# Patient Record
Sex: MALE | Race: WHITE
[De-identification: names, ages, dates, MRNs, and addresses within clinical notes are randomized per-mention and may not be internally consistent; named-entity substitution may affect disease eponyms.]

---

## 2018-05-17 ENCOUNTER — HOSPITAL ENCOUNTER (EMERGENCY)
Dept: HOSPITAL 25 - ED | Age: 23
LOS: 1 days | Discharge: HOME | End: 2018-05-18
Payer: COMMERCIAL
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
## 2018-05-17 DIAGNOSIS — S42.002A: Primary | ICD-10-CM
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
## 2018-05-17 DIAGNOSIS — M25.512: ICD-10-CM

## 2018-05-17 PROCEDURE — 99282 EMERGENCY DEPT VISIT SF MDM: CPT

## 2018-05-18 NOTE — RAD
INDICATION:  Left shoulder injury.



TECHNIQUE: 5 views of the left shoulder were obtained.



FINDINGS:  There is a comminuted displaced fracture of the distal clavicle. The fracture

fragments are distracted. No additional fracture is seen.  



IMPRESSION:  COMMINUTED DISPLACED FRACTURE OF THE CLAVICLE.

## 2018-05-18 NOTE — ED
Dane MARTINEZ Angela, scribed for Jackson Simpson MD on 05/18/18 at 0019 .





Upper Extremity Pain





- HPI Summary


HPI Summary: 





This pt is a 21 y/o male presenting to [facility] c/o left shoulder pain today s/p 

fall. Pt reports he was in a hammock when he fell off and injured his left 

shoulder at approximately 22:00. Denies head strike or LOC. Pt denies any other 

injuries sustained from fall. He is right hand dominant.


Denies any PMHx. 





- History of Current Complaint


Chief Complaint: EDShoulderClavicleInj


Stated Complaint: LT SHOULDER INJURY


Hx Obtained From: Patient


Mechanism Of Injury: Direct Blow, Fall From Height Of: - hammock


Onset/Duration: Started Hours Ago - 2, Traumatic, Still Present


Timing: Constant, Lasting Hours - 2


Severity Currently: Moderate


Pain Location: Shoulder - left


Aggravating Factor(s): Movement


Alleviating Factor(s): Rest


Related History: Dominant Hand Right





- Allergies/Home Medications


Allergies/Adverse Reactions: 


 Allergies











Allergy/AdvReac Type Severity Reaction Status Date / Time


 


No Known Allergies Allergy   Verified 05/17/18 23:08














PMH/Surg Hx/FS Hx/Imm Hx


Endocrine/Hematology History: 


   Denies: Hx Diabetes


Cardiovascular History: 


   Denies: Hx Hypertension


Infectious Disease History: No


Infectious Disease History: 


   Denies: Traveled Outside the [place] in Last 30 Days





- Family History


Known Family History: 


   Negative: Cardiac Disease, Hypertension





- Social History


Alcohol Use: None


Substance Use Type: Reports: None


Smoking Status (MU): Never Smoked Tobacco





Review of Systems


Negative: Fever


ENT: Negative


Cardiovascular: Negative


Respiratory: Negative


Gastrointestinal: Negative


Musculoskeletal: Other - left shoulder pain


Neurological: Negative


All Other Systems Reviewed And Are Negative: Yes





Physical Exam





- Summary


Physical Exam Summary: 





Appearance: Well appearing, no pain distress


Skin: warm, dry, reflects adequate perfusion


Head/face: normal


Eyes: EOMI, HÉCTOR


ENT: normal


Neck: supple, non-tender


Respiratory: CTA, breath sounds present


Cardiovascular: RRR, pulses symmetrical 


Abdomen: non-tender, soft


Bowel: present


Musculoskeletal: LUE: deformity on distal end of left clavicle. Arm is held in 

flexion. Crepitance and deformity on left clavicular head. Normal sensation 

over the deltoid. Normal sensation and motor function of left arm. Good pulses. 


Neuro: normal, sensory motor intact, A&Ox3


Triage Information Reviewed: Yes


Vital Signs On Initial Exam: 


 Initial Vitals











Temp Pulse Resp BP Pulse Ox


 


 99.3 F   104   16   117/83   100 


 


 05/17/18 23:00  05/17/18 23:00  05/17/18 23:00  05/17/18 23:00  05/17/18 23:00











Vital Signs Reviewed: Yes





Diagnostics





- Vital Signs


 Vital Signs











  Temp Pulse Resp BP Pulse Ox


 


 05/17/18 23:00  99.3 F  104  16  117/83  100














- Laboratory


Lab Statement: Any lab studies that have been ordered have been reviewed, and 

results considered in the medical decision making process.





- Radiology


  ** Left shoulder XR


Xray Interpretation: Positive (See Comments) - shoulder XR is negative aside 

for clavicular fracture


Radiology Interpretation Completed By: ED Physician





  ** Left clavicle XR


Xray Interpretation: Positive (See Comments) - clavicular head fracture


Radiology Interpretation Completed By: ED Physician





Course/Dx





- Course


Course Of Treatment: Patient with a significant fractures of the distal end of 

his clavicle.  The extremity is neurovascularly intact.  He was placed in a 

sling by me.  He will follow-up with orthopedic surgery.  He was given pain 

control here and prescription for same.





- Diagnoses


Provider Diagnoses: 


 Clavicular fracture








Discharge





- Sign-Out/Discharge


Documenting (check all that apply): Discharge/Admit/Transfer - Discharge





- Discharge Plan


Condition: Good


Disposition: HOME


Prescriptions: 


traMADol TAB* [Ultram*] 50 mg PO TID #10 tab MDD 3


Patient Education Materials:  Clavicle Fracture (ED)


Referrals: 


Rico Bolton MD [Medical Doctor] - 


Additional Instructions: 


Ice sore area.  Keep shoulder immobilized for discomfort.  Call the orthopedist 

in the morning to schedule follow-up appointment.  Nonweightbearing in the left 

upper extremity





- Billing Disposition and Condition


Condition: GOOD


Disposition: HOME





The documentation as recorded by the Dane madden Angela accurately reflects 

the service I personally performed and the decisions made by me, Jackson Simpson MD.

## 2018-05-18 NOTE — RAD
Indication: Left clavicle fracture.



2 views of left clavicle demonstrates fracture of the distal clavicle with depression and

inferior angulation.. There is lucency in the distal clavicle and a pathologic fracture is

not excluded.



IMPRESSION: Comminuted fracture of the distal clavicle with inferior angulation.

## 2018-06-08 ENCOUNTER — HOSPITAL ENCOUNTER (OUTPATIENT)
Dept: HOSPITAL 25 - OR | Age: 23
Discharge: HOME | End: 2018-06-08
Attending: ORTHOPAEDIC SURGERY
Payer: COMMERCIAL

## 2018-06-08 VITALS — DIASTOLIC BLOOD PRESSURE: 63 MMHG | SYSTOLIC BLOOD PRESSURE: 127 MMHG

## 2018-06-08 DIAGNOSIS — G89.18: ICD-10-CM

## 2018-06-08 DIAGNOSIS — F41.8: ICD-10-CM
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
## 2018-06-08 DIAGNOSIS — S42.032A: Primary | ICD-10-CM
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
## 2018-06-08 PROCEDURE — C1713 ANCHOR/SCREW BN/BN,TIS/BN: HCPCS

## 2018-06-08 PROCEDURE — 76001: CPT

## 2018-06-08 PROCEDURE — C1776 JOINT DEVICE (IMPLANTABLE): HCPCS

## 2018-06-09 NOTE — OP
DATE OF OPERATION:  06/08/18 - [facility]

 

DATE OF BIRTH:  [birth year]

 

SURGEON:  Bandar Strauss MD

 

ASSISTANT:  VERÓNICA Jamison.  An assistant was needed for the procedure to aid 
in positioning of the arm, retraction, and instrumentation.

 

ANESTHESIOLOGIST:  Dilcia Olivera MD

 

ANESTHESIA:  General with regional block.

 

PRE-OP DIAGNOSIS:  Left displaced distal clavicle fracture.

 

POST-OP DIAGNOSIS:  Left displaced distal clavicle fracture.

 

OPERATIVE PROCEDURE:  Open reduction internal fixation of left displaced distal 
clavicle fracture.

 

INDICATIONS:  Sean is 22 years old.  He had a very displaced distal third 
clavicle fracture.  We had talked about risks and benefits of surgery.  He had 
wanted to proceed with surgery.  Given the level of displacement, I do think 
that was very reasonable and recommended.

 

ESTIMATED BLOOD LOSS:  15 mL.

 

COMPLICATIONS:  None.

 

FINDINGS:  See above and below.

 

DESCRIPTION OF PROCEDURE:  Sean was seen in the preoperative holding area.  
The correct site, side, and procedure were identified.  We came back to the 
operating room where he was positioned in the lazy beach chair position.  The 
hand was prepped and draped in the usual fashion.  A time-out was performed.

 

I began by making a longitudinal incision over the distal half of the clavicle 
and over onto the acromion.  Dissection was carried down.  The AC ligaments 
were preserved.  A subperiosteal dissection was performed exposing the proximal 
clavicle and the distal fragment.  There was some soft callus formation as were 
a few weeks out from the fracture.  This was taken down until I got back to 
nice bony edges. The fracture was reduced by bringing the arm up and pushing 
the clavicle down.  The reduction was clamped into place.  I then brought in my 
Synthes distal clavicle plate, I had both that and the subacromial hook plate 
available.  I wanted to try to use the distal clavicle plate to avoid having to 
go through the AC joint causing any subacromial issues.  I felt like I did have 
enough bone to do this.  I bent and contoured the plate so it sat down nicely 
on the bone.  I then secured the plate distally with multiple locking screws.  
I then came proximal and filled the proximal holes with 3.5-mm cortical screws.
  I was able to put a couple of cortical screws across the fracture as well in 
the more proximal aspect of the distal fragment.  These grabbed on to the 
inferior surface of the clavicle.  There was not really anything on the 
superior surface for the screws to grab on to.  Once I was able to get all the 
screws in place, everything was looking good.  It was solid.  The bone was 
moving nicely as one piece.  There was some excellent compression across the 
fracture.  I did pack some of the removed callus around the fracture.  The 
wound was irrigated out.  I closed the clavipectoral fascia with 2-0 Vicryl 
suture with the knots buried.  The subcutaneous tissue was reapproximated with 3
-0 Vicryl suture, skin was closed with 3-0 Monocryl suture and Steri-Strips.  
The wound was infiltrated with 0.25% plain Marcaine per the recommendations of 
the anesthesiologist, as she thought the medial side of the wound might need a 
little bit more local anesthesia as the block might not cover it.  The wound 
was dressed with 4x4s and Tegaderm.  The arm was placed in a sling.  He was 
woken up and taken to the recovery room in stable condition.

 

 [id number]/CPS #: [id number]

LYNDA

## 2018-06-11 NOTE — RAD
INDICATION: Left clavicle ORIF, injury



COMPARISONS: May 17, 2018



TECHNIQUE: Fluoroscopy was provided for a surgical procedure. Total fluoroscopy time is:

11.2 seconds



FINDINGS: Spot images demonstrate internal fixation of the left clavicle.



IMPRESSION: FLUOROSCOPY WAS PROVIDED FOR A SURGICAL PROCEDURE



CPT II Codes:

## 2018-12-29 ENCOUNTER — HOSPITAL ENCOUNTER (EMERGENCY)
Dept: HOSPITAL 25 - ED | Age: 23
Discharge: HOME | End: 2018-12-29
Payer: COMMERCIAL

## 2018-12-29 VITALS — SYSTOLIC BLOOD PRESSURE: 141 MMHG | DIASTOLIC BLOOD PRESSURE: 72 MMHG

## 2018-12-29 DIAGNOSIS — R51: ICD-10-CM

## 2018-12-29 DIAGNOSIS — R42: Primary | ICD-10-CM

## 2018-12-29 DIAGNOSIS — R00.0: ICD-10-CM

## 2018-12-29 LAB
ALBUMIN SERPL BCG-MCNC: 4.3 G/DL (ref 3.2–5.2)
ALBUMIN/GLOB SERPL: 1.7 {RATIO} (ref 1–3)
ALP SERPL-CCNC: 81 U/L (ref 34–104)
ALT SERPL W P-5'-P-CCNC: 33 U/L (ref 7–52)
ANION GAP SERPL CALC-SCNC: 8 MMOL/L (ref 2–11)
AST SERPL-CCNC: 17 U/L (ref 13–39)
BASOPHILS # BLD AUTO: 0.1 10^3/UL (ref 0–0.2)
BUN SERPL-MCNC: 15 MG/DL (ref 6–24)
BUN/CREAT SERPL: 15.3 (ref 8–20)
CALCIUM SERPL-MCNC: 9.1 MG/DL (ref 8.6–10.3)
CHLORIDE SERPL-SCNC: 105 MMOL/L (ref 101–111)
EOSINOPHIL # BLD AUTO: 0.1 10^3/UL (ref 0–0.6)
GLOBULIN SER CALC-MCNC: 2.5 G/DL (ref 2–4)
GLUCOSE SERPL-MCNC: 106 MG/DL (ref 70–100)
HCO3 SERPL-SCNC: 25 MMOL/L (ref 22–32)
HCT VFR BLD AUTO: 41 % (ref 42–52)
HGB BLD-MCNC: 13.9 G/DL (ref 14–18)
LYMPHOCYTES # BLD AUTO: 1.1 10^3/UL (ref 1–4.8)
MAGNESIUM SERPL-MCNC: 2.2 MG/DL (ref 1.9–2.7)
MCH RBC QN AUTO: 30 PG (ref 27–31)
MCHC RBC AUTO-ENTMCNC: 34 G/DL (ref 31–36)
MCV RBC AUTO: 87 FL (ref 80–94)
MONOCYTES # BLD AUTO: 0.6 10^3/UL (ref 0–0.8)
NEUTROPHILS # BLD AUTO: 6.2 10^3/UL (ref 1.5–7.7)
NRBC # BLD AUTO: 0 10^3/UL
NRBC BLD QL AUTO: 0
PLATELET # BLD AUTO: 368 10^3/UL (ref 150–450)
POTASSIUM SERPL-SCNC: 3.7 MMOL/L (ref 3.5–5)
PROT SERPL-MCNC: 6.8 G/DL (ref 6.4–8.9)
RBC # BLD AUTO: 4.68 10^6/UL (ref 4–5.4)
SODIUM SERPL-SCNC: 138 MMOL/L (ref 135–145)
TSH SERPL-ACNC: 1.17 MCIU/ML (ref 0.34–5.6)
WBC # BLD AUTO: 8 10^3/UL (ref 3.5–10.8)

## 2018-12-29 PROCEDURE — 85025 COMPLETE CBC W/AUTO DIFF WBC: CPT

## 2018-12-29 PROCEDURE — 83735 ASSAY OF MAGNESIUM: CPT

## 2018-12-29 PROCEDURE — 83605 ASSAY OF LACTIC ACID: CPT

## 2018-12-29 PROCEDURE — 84484 ASSAY OF TROPONIN QUANT: CPT

## 2018-12-29 PROCEDURE — 93005 ELECTROCARDIOGRAM TRACING: CPT

## 2018-12-29 PROCEDURE — 84443 ASSAY THYROID STIM HORMONE: CPT

## 2018-12-29 PROCEDURE — 36415 COLL VENOUS BLD VENIPUNCTURE: CPT

## 2018-12-29 PROCEDURE — 99282 EMERGENCY DEPT VISIT SF MDM: CPT

## 2018-12-29 PROCEDURE — 86140 C-REACTIVE PROTEIN: CPT

## 2018-12-29 PROCEDURE — 80053 COMPREHEN METABOLIC PANEL: CPT

## 2018-12-29 PROCEDURE — 96374 THER/PROPH/DIAG INJ IV PUSH: CPT

## 2018-12-29 NOTE — ED
Dizziness





- HPI Summary


HPI Summary: 


23-year-old male presents with dizziness for the past 6 months.  He states that 

symptoms are intermittent and only last an hour but today has lasted 6 hours. 

symptoms mostly happen at night. He states it is worse with positional changes. 

He states gets feeling that room is spinning and that is detached from reality.

  Has not tried anything for his symptoms.  He admits occasional headache with 

the pain.  He denies any change in vision.  He states that caffeine makes it 

worse.  He denies any recent illness. he denies any photophobia. No fevers.  No 

sinus congestion.  He has chronic neck pain.  No nausea and vomiting.  No head 

injury.  Denies any chest pain or shortness of breath.  Has no medical 

conditions.  Hasn't followed with anyone about this.  was sick last week with 

viral illness. 








- History Of Current Complaint


Chief Complaint: EDDizziness


Stated Complaint: DIZZINESS


Time Seen by Provider: 12/29/18 18:08





- Allergies/Home Medications


Allergies/Adverse Reactions: 


 Allergies











Allergy/AdvReac Type Severity Reaction Status Date / Time


 


No Known Allergies Allergy   Verified 12/29/18 18:03














PMH/Surg Hx/FS Hx/Imm Hx


Endocrine/Hematology History: 


   Denies: Hx Diabetes


Cardiovascular History: 


   Denies: Hx Hypertension, Other Cardiovascular Problems/Disorders


Respiratory History: Reports: Other Respiratory Problems/Disorders - history of 

croup age 3 months


GI History: Reports: Hx Jaundice - as an infant


   Denies: Other GI Disorders


 History: 


   Denies: Other  Problems/Disorders


Musculoskeletal History: Reports: Other Musculoskeletal History - Displaced 

fracture of lateral end of left clavicle


Sensory History: 


   Denies: Hx Contacts or Glasses, Hx Hearing Aid


Opthamlomology History: 


   Denies: Hx Contacts or Glasses


Neurological History: 


   Denies: Other Neuro Impairments/Disorders


Psychiatric History: Reports: Hx Anxiety - history of, no current meds, Hx 

Depression - history of, no current meds





- Surgical History


Surgery Procedure, Year, and Place: left clavical surg


Infectious Disease History: No


Infectious Disease History: 


   Denies: Traveled Outside the [place] in Last 30 Days





- Family History


Known Family History: Positive: None - Pt denies PMHX


   Negative: Cardiac Disease, Hypertension





- Social History


Alcohol Use: Occasionally


Substance Use Type: Reports: None


Smoking Status (MU): Never Smoked Tobacco





Review of Systems


Negative: Fever


Negative: Chest Pain


Negative: Shortness Of Breath


Neurological: Other - dizziness


Positive: Headache


All Other Systems Reviewed And Are Negative: Yes





Physical Exam


Triage Information Reviewed: Yes


Vital Signs On Initial Exam: 


 Initial Vitals











Temp Pulse Resp BP Pulse Ox


 


 98.2 F   111   16   141/73   95 


 


 12/29/18 18:03  12/29/18 18:03  12/29/18 18:03  12/29/18 18:03  12/29/18 18:03











Vital Signs Reviewed: Yes


Appearance: Positive: Well-Appearing


Skin: Positive: Warm, Dry


Head/Face: Positive: Normal Head/Face Inspection


Eyes: Positive: Normal, EOMI, HÉCTOR, Conjunctiva Clear, Other: - nystagmus on 

exam


ENT: Positive: Normal ENT inspection, Pharynx normal, TMs normal


Respiratory/Lung Sounds: Positive: Clear to Auscultation, Breath Sounds Present


Cardiovascular: Positive: Normal, RRR


Abdomen Description: Positive: Nontender, Soft


Bowel Sounds: Positive: Present


Musculoskeletal: Positive: Normal


Neurological: Positive: Sensory/Motor Intact, Alert, Oriented to Person Place, 

Time, CN Intact II-III, Ron-[place] Test - positive


Psychiatric: Positive: Normal


AVPU Assessment: Alert





Diagnostics





- Vital Signs


 Vital Signs











  Temp Pulse Resp BP Pulse Ox


 


 12/29/18 18:33   104    99


 


 12/29/18 18:32   99   139/87  98


 


 12/29/18 18:03  98.2 F  111  16  141/73  95














- Laboratory


Lab Results: 


 Lab Results











  12/29/18 Range/Units





  18:45 


 


WBC  8.0  (3.5-10.8)  10^3/ul


 


RBC  4.68  (4.00-5.40)  10^6/ul


 


Hgb  13.9 L  (14.0-18.0)  g/dl


 


Hct  41 L  (42-52)  %


 


MCV  87  (80-94)  fL


 


MCH  30  (27-31)  pg


 


MCHC  34  (31-36)  g/dl


 


RDW  13  (10.5-15)  %


 


Plt Count  368  (150-450)  10^3/ul


 


MPV  7.9  (7.4-10.4)  fL


 


Neut % (Auto)  77.5  %


 


Lymph % (Auto)  13.5  %


 


Mono % (Auto)  7.2  %


 


Eos % (Auto)  1.2  %


 


Baso % (Auto)  0.6  %


 


Absolute Neuts (auto)  6.2  (1.5-7.7)  10^3/ul


 


Absolute Lymphs (auto)  1.1  (1.0-4.8)  10^3/ul


 


Absolute Monos (auto)  0.6  (0-0.8)  10^3/ul


 


Absolute Eos (auto)  0.1  (0-0.6)  10^3/ul


 


Absolute Basos (auto)  0.1  (0-0.2)  10^3/ul


 


Absolute Nucleated RBC  0  10^3/ul


 


Nucleated RBC %  0  











Result Diagrams: 


 12/29/18 18:45





 12/29/18 18:45


Lab Statement: Any lab studies that have been ordered have been reviewed, and 

results considered in the medical decision making process.





- EKG


  ** No standard instances


Cardiac Rate: Tachycardia


EKG Rhythm: Sinus Tachycardia


Summary of EKG Findings: sinus tachycardia





Re-Evaluation





- Re-Evaluation


  ** First Eval


Re-Evaluation Time: 19:49


Change: Improved


Comment: vertigo improved





Dizzy Course/Dx





- Course


Course Of Treatment: 23-year-old male presents with dizziness for the past 6 

months.  He states that symptoms are intermittent and only last an hour but 

today has lasted 6 hours. symptoms mostly happen at night. He states it is 

worse with positional changes. He states gets feeling that room is spinning and 

that is detached from reality.  Has not tried anything for his symptoms.  He 

admits occasional headache with the pain.  He denies any change in vision.  He 

states that caffeine makes it worse.  He denies any recent illness. he denies 

any photophobia. No fevers.  No sinus congestion.  He has chronic neck pain.  

No nausea and vomiting.  No head injury.  Denies any chest pain or shortness of 

breath.  Has no medical conditions.  Hasn't followed with anyone about this. on 

exam has nystagmus present. normal neuro exam. pos ron-[place]. will 

prescribe meclizine. will have follow up with PT and primary. patient 

understand and agrees with plan.





- Diagnoses


Differential Diagnosis/HQI/PQRI: Benign Paroxysmal Positional Vertigo, 

Labyrinthitis, Metabolic Abnormality


Provider Diagnoses: 


 Vertigo








Discharge





- Sign-Out/Discharge


Documenting (check all that apply): Patient Departure





- Discharge Plan


Condition: Good


Disposition: HOME


Patient Education Materials:  Vertigo (ED)


Referrals: 


Carlos Long MD [Primary Care Provider] - 


[facility] Physical therapy,PT [Medical Doctor] - 


Additional Instructions: 


Follow up with primary


Take meclizine up to 3 tablets a day for vertigo


Drink plenty of fluids


Follow up with physical therapy


Return to ED if develop any new or worsening symptoms





- Billing Disposition and Condition


Condition: GOOD


Disposition: Home

## 2019-04-04 ENCOUNTER — HOSPITAL ENCOUNTER (OUTPATIENT)
Dept: HOSPITAL 25 - OREAST | Age: 24
Discharge: HOME | End: 2019-04-04
Attending: ORTHOPAEDIC SURGERY
Payer: COMMERCIAL

## 2019-04-04 VITALS — SYSTOLIC BLOOD PRESSURE: 119 MMHG | DIASTOLIC BLOOD PRESSURE: 85 MMHG

## 2019-04-04 DIAGNOSIS — T84.84XA: Primary | ICD-10-CM
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
## 2019-04-04 DIAGNOSIS — S42.032D: ICD-10-CM
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
## 2019-04-04 DIAGNOSIS — F41.8: ICD-10-CM

## 2019-04-04 PROCEDURE — 88300 SURGICAL PATH GROSS: CPT

## 2019-04-04 NOTE — OP
DATE OF OPERATION:  04/04/19 [facility]

 

DATE OF BIRTH:  [birth year]

 

SURGEON:  Bandar Strauss MD

 

ASSISTANT:  VERÓNICA Urban.  An assistant was needed for the procedure to aid 
in positioning of the arm and retraction.

 

ANESTHESIOLOGIST:  Dr. Timmons.

 

ANESTHESIA:  General.

 

PRE-OP DIAGNOSIS:  Healed left distal clavicle fracture with retained and 
symptomatic hardware.

 

POST-OP DIAGNOSIS:  Healed left distal clavicle fracture with retained and 
symptomatic hardware.

 

OPERATIVE PROCEDURE:  Removal of distal clavicle plate and screws.

 

ESTIMATED BLOOD LOSS:  20 mL.

 

COMPLICATIONS:  None.

 

FINDINGS:  See above and below.

 

INDICATIONS:  Sean had the clavicle fixed a while back in June 2018.  The 
plate is prominent and he wants to have it excised.

 

DESCRIPTION OF PROCEDURE:  Sean was seen in the preoperative holding area.  
The correct site, side, and procedure were identified.  We came back to the 
operating room.  The patient was positioned in the lazy beach chair position 
with a bump under the shoulder blade.  The arm was prepped and draped in the 
usual fashion and a time-out was performed.

 

I reopened his prior wound.  Dissection was carried down and the clavipectoral 
fascia was opened.  The plate was exposed.  All of the proximal screws were 
removed with a hexagonal screwdriver and all the distal screws were then 
removed.  Everything came out nice and smoothly.  The wound was irrigated out.  
All the bone spurs and any rough edges were trimmed back with rongeur.  The 
clavipectoral fascia was closed with 3-0 Vicryl suture.  The subcutaneous 
tissue was reapproximated with 3-0 Vicryl suture.  Skin was closed with 4-0 
Monocryl and Steri-Strips.  0.25% Marcaine was infiltrated all around the 
operative area.  The wound was dressed and the patient was taken to the 
recovery room in stable condition.

 

 [id number]/CPS #: [id number]

LYNDA

## 2020-03-12 ENCOUNTER — HOSPITAL ENCOUNTER (EMERGENCY)
Dept: HOSPITAL 25 - UCEAST | Age: 25
Discharge: HOME | End: 2020-03-12
Payer: COMMERCIAL

## 2020-03-12 VITALS — DIASTOLIC BLOOD PRESSURE: 83 MMHG | SYSTOLIC BLOOD PRESSURE: 134 MMHG

## 2020-03-12 DIAGNOSIS — J06.9: ICD-10-CM

## 2020-03-12 DIAGNOSIS — J02.9: Primary | ICD-10-CM

## 2020-03-12 PROCEDURE — 87651 STREP A DNA AMP PROBE: CPT

## 2020-03-12 PROCEDURE — G0463 HOSPITAL OUTPT CLINIC VISIT: HCPCS

## 2020-03-12 PROCEDURE — 99211 OFF/OP EST MAY X REQ PHY/QHP: CPT

## 2020-03-12 NOTE — XMS REPORT
Summary of Care

 Created on:2020



Patient:Sean Hanson

Sex:Male

:1995

External Reference #:[id number]





Demographics







 Address  [address] [place] [address]

 

 Home Phone  [phone]

 

 Mobile Phone  [phone]

 

 Preferred Language  English

 

 Marital Status  Not  or 

 

 Synagogue Affiliation  Unknown

 

 Race  White

 

 Ethnic Group  Not  or 









Author







 Organization  [facility]

 

 Address  [address] [place] [address]









Support







 Name  Relationship  Address  Phone

 

 Manuela Hanson  Mother  Unavailable  [phone]









Care Team Providers







 Name  Role  Phone

 

 Carlos Long MD  Primary Care Provider  [phone]









Reason for Referral

Diagnostic Radiology (Routine)





 Status  Reason  Specialty  Diagnoses /  Referred By Contact  Referred To



       Procedures    Contact

 

 Open    Radiology  Diagnoses



Migraine without aura and without status migrainosus, not intractable  Angelique Clayton MD



  



       



Procedures



MR Brain with and without Contrast  [address] [address]



  



         Phone: [phone]



  



         Fax: [phone]



  



         Email:  



         [email or web address]  









Reason for Visit







 Reason  Comments

 

 New Patient  



Consultation (Routine)





 Status  Reason  Specialty  Diagnoses /  Referred By  Referred To Contact



       Procedures  Contact  

 

 Authorized    Neurology  Diagnoses



Headache  Carlos Long  Neurology



       



Procedures



referral to Funmilayo STEPHENS MD



  Provider-Based [facility]







         [address] [place] [address]



  [address] [place] [address]



  4th Floor, [address]







         Phone:  [place], [place]



         [phone]







         Fax: [phone]  Phone: [phone]







           Fax: [phone]









Encounter Details







 Date  Type  Department  Care Team  Description

 

 2020  Office Visit  [facility] Neurology at  Angelique Clayton MD



  Migraine without



     Critical access hospital  [address] [facility]



  aura and without



     Center



  4th Floor



  status migrainosus,



     [address] [place] [address]



  not intractable



     4th Floor, [address]



  [phone]



  (Primary Dx)



     [place], [place]  [phone] (Fax)  



     [address] [phone]    







Allergies

No Known Allergiesdocumented as of this encounter (statuses as of 2020)



Medications







 Medication  Sig  Dispensed  Refills  Start Date  End Date  Status

 

 FLUoxetine HCl 40 MG  Take 40 mg    0      Active



 Oral Capsule (PROZAC)  by mouth          



   daily          

 

 Propranolol HCl ER 60  Take 1 pill  60 capsule  5  2020    Active



 MG Oral Capsule  QHS for 2          



 Extended Release 24  weeks, then          



 Hour (INDERAL LA)  2 pills QHS          

 

 ALPRAZolam 1 MG Oral  Take 1 mg by    0      Discontinued



 Tablet (XANAX)  mouth        0  



   nightly as          



   needed  for          



   Sleep          

 

 Butalbital-APAP-Caffei  Take 1    0      Discontinued



 ne -40 MG Oral  tablet by        0  



 Tablet (FIORICET,  mouth every          



 ESGIC)  6 (six)          



   hours as          



   needed  for          



   Pain          

 

 Prochlorperazine  Take 10 mg    0      Discontinued



 Maleate 10 MG Oral  by mouth        0  



 Tablet (COMPAZINE)  every 6          



   (six) hours          



   as needed          

 

 Topiramate 25 MG Oral  Take 25 mg    0      Discontinued



 Capsule Sprinkle  by mouth Two        0  



 (TOPAMAX)  Times Daily          



documented as of this encounter (statuses as of 2020)



Active Problems

No known active problemsdocumented as of this encounter (statuses as of 2020)



Social History







 Tobacco Use  Types  Packs/Day  Years Used  Date

 

 Current Some Day Smoker  Cigarettes      









 Smokeless Tobacco: Never Used      









 Alcohol Use  Drinks/Week  oz/Week  Comments

 

 Yes      









 Alcohol Habits  Answer  Date Recorded

 

 How often do you have a drink containing alcohol?  2-3 times a week  2020

 

 How many drinks containing alcohol do you have on a  1 or 2  2020



 typical day when you are drinking?    

 

 How often do you have six or more drinks on one  Never  2020



 occasion?    









 Sex Assigned at Birth  Date Recorded

 

 Not on file  









 Job Start Date  Occupation  Industry

 

 Not on file  Not on file  Not on file









 Travel History  Travel Start  Travel End









 No recent travel history available.



documented as of this encounter



Last Filed Vital Signs







 Vital Sign  Reading  Time Taken  Comments

 

 Blood Pressure  125/70  2020  8:26 AM EST  

 

 Pulse  91  2020  8:26 AM EST  

 

 Temperature  -  -  

 

 Respiratory Rate  -  -  

 

 Oxygen Saturation  -  -  

 

 Inhaled Oxygen Concentration  -  -  

 

 Weight  80.9 kg (178 lb 6.4 oz)  2020  8:26 AM EST  

 

 Height  172.7 cm (5' 8")  2020  8:26 AM EST  

 

 Body Mass Index  27.13  2020  8:26 AM EST  



documented in this encounter



Progress Notes

Angelique Clayton MD - 2020  8:30 AM ESTFormatting of this note might be 
different from the original.

[place] [address]

PATIENT NAME: Sean Hanson,  YOB: 1995

MEDICAL RECORD NUMBER: [id number].



[facility]



History of presenting illness

Sean Hanson is a 24 y.o. male who presents to the clinic for evaluation of 
headache. HA started since teenager years but increased in intensity and 
frequency 2 years ago, while in "bad relationship".

At this time, HA is usually daily, however he has cycles when it is not as 
predominant. HA almost always starts in the morning, upon awakening. It is 
located on top of head and described as pressure. Pain can reach 7-8/10. Its 
duration is very variable from 10-15 minutes, to the whole day, but on average 1
-2 hours. However, he still has "remnance" of the headache feeling that usually 
persists.

 He does not know what makes it disappear, but lying down helps. HA usually 
worsens when he lifts heavy objects. He may get exacerbation of headache for 
seconds to minutes when he lifts heavy even if he does not have headache at 
that moment. Head-down positions also worsen the headache significantly. OTC 
such as Excedrin, Ibuprofen and APAP did not help.

Her has brain fog with headaches. He also has nausea/vomiting at times with 
headache. Very bright lights exacerbate his headaches, as do loud noises. No 
blurring of eyes. He has constant tinnitus but not pulsatile.



He was attempted on Indocin, Naprosyn, Imitrex, Maxalt, Amerge Fioricet and 
Topamax 25 mg BID. He takes Prozac for anxiety which has been well controlled. 
In the past, he tried Paxil, effexor and zoloft.

He drinks 1-2 cups of coffee a day. He snores during sleep, but he sleeps well 
overall. He is scheduled for a sleep study.



No family history of migraines.



Review of Systems

Complete ROS was obtained and is negative except for what is mentioned in HPI



Past Medical History:

Diagnosis Date

 ADD (attention deficit disorder)



Past Surgical History:

Procedure Laterality Date

 CLAVICLE SURGERY

 2018



Social History



Socioeconomic History

 Marital status: Single

  Spouse name: Not on file

 Number of children: Not on file

 Years of education: Not on file

 Highest education level: Not on file

Occupational History

 Not on file

Social Needs

 Financial resource strain: Not on file

 Food insecurity:

  Worry: Not on file

  Inability: Not on file

 Transportation needs:

  Medical: Not on file

  Non-medical: Not on file

Tobacco Use

 Smoking status: Current Some Day Smoker

  Types: Cigarettes

 Smokeless tobacco: Never Used

Substance and Sexual Activity

 Alcohol use: Yes

  Frequency: 2-3 times a week

  Drinks per session: 1 or 2

  Binge frequency: Never

 Drug use: Never

 Sexual activity: Yes

  Partners: Female

Lifestyle

 Physical activity:

  Days per week: Not on file

  Minutes per session: Not on file

 Stress: Not on file

Relationships

 Social connections:

  Talks on phone: Not on file

  Gets together: Not on file

  Attends Cheondoism service: Not on file

  Active member of club or organization: Not on file

  Attends meetings of clubs or organizations: Not on file

  Relationship status: Not on file

 Intimate partner violence:

  Fear of current or ex partner: Not on file

  Emotionally abused: Not on file

  Physically abused: Not on file

  Forced sexual activity: Not on file

Other Topics Concern

 Not on file

Social History Narrative

 Not on file



Family History

Problem Relation Age of Onset

 Anxiety disorder Mother

 Anxiety disorder Father

 No Known Problems Half brother





No Known Allergies



Current Outpatient Medications

Medication Sig Dispense Refill

 FLUoxetine HCl 40 MG Oral Capsule (PROZAC) Take 40 mg by mouth daily



No current facility-administered medications for this visit.



Physical exam

Visit Vitals

/70 (BP Location: Left arm, Patient Position: Sitting)

Pulse 91

Ht 1.727 m (5' 8")

Wt 80.9 kg (178 lb 6.4 oz)

BMI 27.13 kg/m



General Examination: In no apparent distress, well developed and well nourished
, and cooperative

HEENT : atraumatic, normocephalic. Mild allodynia of scalp

Neck: supple, no taut bands palpated

Chest: Breathing comfortably on room air

Extremities : no cyanosis, clubbing or edema



Neurological exam:

Alert and oriented x3, speech normal.

Pupils symmetric and reactive to light.

Extraocular movements intact with no nystagmus. Optic disc is flat OU.

Face symmetric, V1-V3 intact to light touch

Tongue is in midline, palate elevates upward symmetrically.

Muscle tone and bulk normal, motor strength 5/5 throughout

Sensation intact to light touch in upper and lower extremities

No resting or positional tremor.

Reflexes are brisk but symmetric in the upper and lower extremities.

Finger to nose intact bilaterally.

SJ intact bilaterally.

Gait narrow based and steady, able to walk on heels, toes and tandem walk.



Images

MRI cervical spine: 2018: normal



Assessment and plan

Sean presents with daily morning headache that lies in the middle between 
tension (for the mild ones) and migraine. He has some positional features that 
are concerning for secondary etiology such as Chiari



Plan

Obtain MRI brain w/wo contrast

Start Propranolol 60 mg QHS for 2 weeks then 120 mg QHS. We explained the side 
effects including orthostatic hypotension, insomnia and ED.

He will fax us the results of the sleep study scheduled for tomorrow



RTC in 4 monthsElectronically signed by Angelique Clayton MD at 2020  9:05 AM 
ESTdocumented in this encounter



Plan of Treatment







 Name  Type  Priority  Associated Diagnoses  Order Schedule

 

 MR Brain with and  Imaging  Routine  Migraine without aura and  Expected: ,



 without Contrast      without status  Expires: 2021



       migrainosus, not  



       intractable  



documented as of this encounter



Results

Not on filedocumented in this encounter



Visit Diagnoses







 Diagnosis

 

 Migraine without aura and without status migrainosus, not intractable - Primary







 Migraine without aura, without mention of intractable migraine without mention 
of



 status migrainosus



documented in this encounter

## 2020-03-12 NOTE — UC
Throat Pain/Nasal Thor HPI





- HPI Summary


HPI Summary: 


24-year-old male presenting with sore throat and runny nose 3 days.  Patient 

states her throat is worsening.  Also notes intermittent nausea.  States had a 

fever 3 days ago of 99.7.  Denies chills and body aches.  Denies cough.  Denies 

respiratory wheezing.  Denies vomiting.  Normal appetite.  Denies taking 

anything for symptom relief.








- History of Current Complaint


Chief Complaint: UCGeneralIllness


Stated Complaint: SORE THROAT


Hx Obtained From: Patient


Pain Intensity: 0





- Allergies/Home Medications


Allergies/Adverse Reactions: 


 Allergies











Allergy/AdvReac Type Severity Reaction Status Date / Time


 


No Known Allergies Allergy   Verified 03/12/20 15:56











Home Medications: 


 Home Medications





Fluoxetine HCl [Prozac] 60 mg PO QPM 03/28/19 [History Confirmed 03/12/20]











PMH/Surg Hx/FS Hx/Imm Hx





- Surgical History


Surgical History: Yes


Surgery Procedure, Year, and Place: left clavical surg 7/2018, [facility]





- Family History


Known Family History: Positive: None - Pt denies PMHX


   Negative: Cardiac Disease, Hypertension





- Social History


Alcohol Use: Weekly


Alcohol Amount: 1-2 drinks per week


Substance Use Type: None


Smoking Status (MU): Never Smoked Tobacco


Have You Smoked in the Last Year: No





- Immunization History


Most Recent Tetanus Shot: unkown


Hx Tetanus, Diphtheria Vaccination: No - unkown


Vaccination Up to Date: Yes





Review of Systems


All Other Systems Reviewed And Are Negative: Yes


Constitutional: Positive: Fever - 99.7, Fatigue.  Negative: Chills


ENT: Positive: Sore Throat, Nasal Discharge


Respiratory: Positive: Negative


Cardiovascular: Positive: Negative


Gastrointestinal: Positive: Negative


Musculoskeletal: Positive: Negative


Neurological/Mental Status: Positive: Negative





Physical Exam


Triage Information Reviewed: Yes


Appearance: Well-Appearing, No Pain Distress, Well-Nourished


Vital Signs: 


 Initial Vital Signs











Temp  99.8 F   03/12/20 15:52


 


Pulse  95   03/12/20 15:52


 


Resp  16   03/12/20 15:52


 


BP  134/83   03/12/20 15:52


 


Pulse Ox  99   03/12/20 15:52








 Lab Results











  03/12/20 03/12/20 Range/Units





  17:30 17:45 


 


Influenza A (Rapid)  Negative   (Negative)  


 


Influenza B (Rapid)  Negative   (Negative)  


 


Group A Strep Rapid   Negative  (Negative)  











Vital Signs Reviewed: Yes


Eyes: Positive: Conjunctiva Clear


ENT: Positive: Hearing grossly normal, Pharynx normal, TMs normal, Uvula 

midline.  Negative: Nasal congestion, Nasal drainage, Tonsillar swelling, 

Tonsillar exudate, Sinus tenderness


Neck exam: Normal


Neck: Positive: Supple, Nontender, No Lymphadenopathy


Respiratory Exam: Normal


Respiratory: Positive: Lungs clear, Normal breath sounds, No respiratory 

distress, No accessory muscle use


Cardiovascular Exam: Normal


Cardiovascular: Positive: RRR


Neurological: Positive: Alert


Psychological: Positive: Age Appropriate Behavior


Skin Exam: Normal - no erythema or ecchymosis





Throat Pain/Nasal Course/Dx





- Course


Course Of Treatment: 


Negative rapid strep and flu.  Discussed viral illness patient and instructed 

to treat symptomatically.  Instructed to follow up with PCP if symptoms 

persist.  Patient voiced understanding and agreed with the treatment plan.








- Differential Dx/Diagnosis


Differential Diagnosis/HQI/PQRI: Influenza, Pharyngitis, URI


Provider Diagnosis: 


 Pharyngitis, URI, acute








Discharge ED





- Sign-Out/Discharge


Documenting (check all that apply): Patient Departure


All imaging exams completed and their final reports reviewed: No Studies





- Discharge Plan


Condition: Stable


Disposition: HOME


Patient Education Materials:  Pharyngitis (ED), Upper Respiratory Infection (ED)


Forms:  *Work Release


Referrals: 


Carlos Long MD [Primary Care Provider] - If Needed


Additional Instructions: 


As discussed, your strep and flu test were negative.


You may take your allergy medication to help relieve congestion.


Throat lozenges and throat sprays may help alleviate sore throat.


You may also take Tylenol or ibuprofen as directed.


Follow up with your primary care provider if symptoms do not improve within 7-

10 days.





- Billing Disposition and Condition


Condition: STABLE


Disposition: Home

## 2020-03-12 NOTE — XMS REPORT
Summary of Care

 Created on:2020



Patient:Sean Hanson

Sex:Male

:1995

External Reference #:[id number]





Demographics







 Address  [address] [place] [address]

 

 Mobile Phone  [phone]

 

 Home Phone  [phone]

 

 Work Phone  [phone]

 

 Email Address  [email or web address]

 

 Email Address  [email or web address]

 

 Preferred Language  English

 

 Marital Status  Not  or 

 

 Uatsdin Affiliation  Unknown

 

 Race  White

 

 Ethnic Group  Not  or 









Author







 Organization  [facility]

 

 Address  [address] 









Support







 Name  Relationship  Address  Phone

 

 Manuela Hanson  Unavailable  Unavailable  [phone]

 

 Manuela Hanson  Unavailable  Unavailable  [phone]









Care Team Providers







 Name  Role  Phone

 

 Marily Longew ANGELO  Primary Care Provider  [phone]









Reason for Visit

Sleep Study (Routine)





 Status  Reason  Specialty  Diagnoses /  Referred By  Referred To



       Procedures  Contact  Contact

 

 Closed    Sleep Disorder  Diagnoses



Throat swelling  Melissa Kirkland,  [facility]







         [place]



  [address]







         [address] [facility] [address]



         [place] [address]







         Phone:  Phone:



         [phone]



         Fax: [phone]  









Encounter Details







 Date  Type  Department  Care Team  Description

 

 2020  Hospital Encounter  [facility] Sleep [facility]



    Outpatient



     [address] [address] [phone]    







Allergies

No Known Allergiesdocumented as of this encounter (statuses as of 2020)



Medications







 Medication  Sig  Dispensed  Refills  Start Date  End Date  Status

 

 topiramate (TOPAMAX)  Take 1 Tab by  60 Tab  3  2019    Active



 25 MG Oral Tab  mouth AS DIRECTED.          



   1 at bedtime 10          



   days then 1 twice          



   daily          

 

 ondansetron (ZOFRAN) 4  Take 4 mg by mouth  18 Tab  0  2019    Active



 MG Oral Tab  EVERY EIGHT HOURS          



   AS NEEDED          



   (nausea).          

 

 Fluoxetine HCl  Take 2 Caps by  180 Cap  3  2019    Active



 (PROZAC) 40 MG Oral  mouth DAILY.          



 Cap            



documented as of this encounter (statuses as of 2020)



Active Problems







 Problem  Noted Date

 

 Recurrent major depressive disorder, in partial remission  2019

 

 Mixed obsessional thoughts and acts  2017

 

 Depression with anxiety  10/23/2013









 Overview: 



Citalopram helpful



 lexapro helpful



 Sertraline treatment in the past









 ADHD (attention deficit hyperactivity disorder)  10/23/2013









 Overview: 



Diagnosis age 8 pediatrician



 Adderal ritalin and stratera and vyvanse in past



 Therapist [facility] 



documented as of this encounter (statuses as of 2020)



Resolved Problems







 Problem  Noted Date  Resolved Date

 

 Bipolar II disorder  10/23/2013  2015









 Overview: 



Diagnosis pediatrician age 12



 Adverse drug reaction to paroxitene



 Adverse drug reaction klonazepam age 12 "flipped out"



 Psychiatrist [facility] treatment lithium. Lithium stopped age 15



 S/p therapy



 Citalopram treatment 



  therapy [facility] [place] [place] [facility] -



documented as of this encounter (statuses as of 2020)



Immunizations







 Name  Administration Dates  Next Due

 

 Influenza (IM) Preservative Free  2019, 2017, 10/23/2013  



documented as of this encounter



Social History







 Tobacco Use  Types  Packs/Day  Years Used  Date

 

 Never Smoker        









 Smokeless Tobacco: Never Used      









 Alcohol Use  Drinks/Week  oz/Week  Comments

 

 No  0 Standard drinks or equivalent  0.0  









 Sex Assigned at Birth  Date Recorded

 

 Not on file  









 Job Start Date  Occupation  Industry

 

 Not on file  Not on file  Not on file









 Travel History  Travel Start  Travel End









 No recent travel history available.



documented as of this encounter



Last Filed Vital Signs

Not on filedocumented in this encounter



Plan of Treatment







 Name  Type  Priority  Associated Diagnoses  Order Schedule

 

 REFER TO SLEEP STUDY LAB  Referral  Routine  Throat swelling  Ordered: 2019









 Health Maintenance  Due Date  Last Done  Comments

 

 DTaP/Tdap/Td Vaccines (1 -  2006    



 Tdap)      

 

 DEPRESSION SCREENING  2019,  



     2018  

 

 INFLUENZA VACCINE  Completed  2019,  



     2017,  



     10/23/2013  

 

 HEPATITIS A IMMUNIZATION  Aged Out    No longer eligible based



 SERIES      on patient's age to



       complete this topic

 

 HPV IMMUNIZATION SERIES  Aged Out    No longer eligible based



       on patient's age to



       complete this topic

 

 MENINGOCOCCAL VACCINE IMM  Aged Out    No longer eligible based



       on patient's age to



       complete this topic

 

 PNEUMOCOCCAL 0-64 YRS  Aged Out    No longer eligible based



       on patient's age to



       complete this topic



documented as of this encounter



Goals







 Goal  Patient Goal  Associated  Recent  Patient-Stated?  Author



   Type  Problems  Progress    

 

 Depression  Depression    21  No  Mercedes,



 screen (PHQ-9)      (2018    Carlos STEPHENS,



 total score < 5      2:51 PM EST)    MD









 Note: 



This is an individualized treatment (depression) goal for Sean Hanson:



 Displayed above is your goal for a depression screening (PHQ-9) score that 
would indicate good control of your depression.









 Keep a regular sleep schedule  Lifestyle      Carlos Kent MD









 Note: 



This is an individualized lifestyle goal for Sean Hanson:



 Please maintain a regular sleep schedule.  This may help with some symptoms of 
depression.









 Take all prescribed medications as  Self-management      Carlos Kent MD



 directed          









 Note: 



This is an individualized self-management goal for Sean Hanson:



 Please take all prescribed medications as directed.



 1.  Do not skip doses.  If you cannot afford your medications, talk with your 
doctor.



 2.  Use a pill reminder system such as a pill box if needed.  Your pharmacist 
can help you with this.



 3.  Contact your Pharmacy 5 days before your medication runs out.  If you 
cannot take your medications for any reasons, talk with your doctor.



 4.  Please bring all of your medication bottles and inhalers (or a list of all 
your medications/inhalers) with you to every visit.



 Potential barriers to meeting all of your care plan goals will continue to be 
addressed on an ongoing basis.



documented as of this encounter



Results

Not on filedocumented in this encounter



Insurance







 Payer  Benefit Plan /  Subscriber ID  Effective Dates  Phone  Address  Type



   Group          

 

 SHANIQUA RIGGINS  xxxxxxxxxxxx  2014-Present      [employer]









 Guarantor Name  Account Type  Relation to  Date of  Phone  Billing



     Patient  Birth    Address

 

 Sean Hanson  Personal/Family    1995  [phone]  [address] [place] [address]



         (Home)



  [facility]







         [phone]  [place], [place]



         (Work)  [id number]



documented as of this encounter

## 2020-03-12 NOTE — XMS REPORT
Summary of Care

 Created on:2020



Patient:Sean Hanson

Sex:Male

:1995

External Reference #:[id number]





Demographics







 Address  [address] [place] [address]

 

 Home Phone  [phone]

 

 Mobile Phone  [phone]

 

 Preferred Language  English

 

 Marital Status  Not  or 

 

 [employer] Affiliation  Unknown

 

 Race  White

 

 Ethnic Group  Not  or 









Author







 Organization  [facility]

 

 Address  [address] [place] [address]









Support







 Name  Relationship  Address  Phone

 

 Manuela Hanson  Mother  Unavailable  [phone]









Care Team Providers







 Name  Role  Phone

 

 Carlos Long MD  Primary Care Provider  [phone]









Reason for Referral

Diagnostic Radiology (Routine)





 Status  Reason  Specialty  Diagnoses /  Referred By Contact  Referred To



       Procedures    Contact

 

 Authorized    Radiology  Diagnoses



Migraine without aura and without status migrainosus, not intractable  Angelique Clayton MD



  [facility]







       



Procedures



MR Brain with and without Contrast  [address]



  [address] [facility]







         [address]



         Phone: [phone]







         Fax: [phone]



  Phone:



         Email:  [phone]







         [email or web address]  Fax:



           [phone]









Reason for Visit

Diagnostic Radiology (Routine)





 Status  Reason  Specialty  Diagnoses /  Referred By Contact  Referred To



       Procedures    Contact

 

 Authorized    Radiology  Diagnoses



Migraine without aura and without status migrainosus, not intractable  Angelique Clayton MD



  [facility]







       



Procedures



MR Brain with and without Contrast  [address]



  [address]



         Phone: [phone]







         Fax: [phone]



  Phone:



         Email:  [phone]







         [email or web address]  Fax:



           [phone]









Encounter Details







 Date  Type  Department  Care Team  Description

 

 2020  Hospital Encounter  [facility]



    Migraine without aura



     [address]



    and without status



     [place]



    migrainosus, not



     [place], [place]    intractable



     [id number]



    



     [phone]    







Allergies

No Known Allergiesdocumented as of this encounter (statuses as of 2020)



Medications







 Medication  Sig  Dispensed  Refills  Start Date  End Date  Status

 

 FLUoxetine HCl 40 MG  Take 40 mg by    0      Active



 Oral Capsule (PROZAC)  mouth daily          

 

 Propranolol HCl ER 60  Take 1 pill QHS  60 capsule  5  2020    Active



 MG Oral Capsule  for 2 weeks,          



 Extended Release 24  then 2 pills QHS          



 Hour (INDERAL LA)            



documented as of this encounter (statuses as of 2020)



Active Problems

No known active problemsdocumented as of this encounter (statuses as of 2020)



Social History







 Tobacco Use  Types  Packs/Day  Years Used  Date

 

 Current Some Day Smoker  Cigarettes      









 Smokeless Tobacco: Never Used      









 Alcohol Use  Drinks/Week  oz/Week  Comments

 

 Yes      









 Alcohol Habits  Answer  Date Recorded

 

 How often do you have a drink containing alcohol?  2-3 times a week  2020

 

 How many drinks containing alcohol do you have on a  1 or 2  2020



 typical day when you are drinking?    

 

 How often do you have six or more drinks on one  Never  2020



 occasion?    









 Sex Assigned at Birth  Date Recorded

 

 Not on file  









 Job Start Date  Occupation  Industry

 

 Not on file  Not on file  Not on file









 Travel History  Travel Start  Travel End









 No recent travel history available.



documented as of this encounter



Last Filed Vital Signs

Not on filedocumented in this encounter



Plan of Treatment







 Date  Type  Specialty  Care Team  Description

 

 2020  Office Visit  Neurology  Angelique Clayton MD



  



       [address] [place] [address]



  



       [phone] (Fax)  









 Health Maintenance  Due Date  Last Done  Comments

 

 MMR Vaccines (1 of 1 - Standard  1996    



 series)      

 

 Varicella Vaccines (1 of 2 -  1996    



 2-dose childhood series)      

 

 Pneumococcal Vaccine: Pediatrics  2001    



 (0 to 5 Years) and At-Risk      



 Patients (6 to 64 Years) (1 of 1 -      



 PPSV23)      

 

 DTaP,Tdap,and Td Vaccines (1 -  2002    



 Tdap)      

 

 HIV Screening  2008    

 

 Influenza Vaccine  10/01/2019    

 

 Pneumococcal Vaccine: 65+ Years (1  2060    



 of 2 - PCV13)      

 

 HIB Vaccines  Aged Out    No longer eligible based on



       patient's age to complete this



       topic

 

 Hepatitis A Vaccines  Aged Out    No longer eligible based on



       patient's age to complete this



       topic

 

 Hepatitis B Vaccines  Aged Out    No longer eligible based on



       patient's age to complete this



       topic

 

 IPV Vaccines  Aged Out    No longer eligible based on



       patient's age to complete this



       topic



documented as of this encounter



Procedures







 Procedure Name  Priority  Date/Time  Associated Diagnosis  Comments

 

 MR BRAIN WITH AND  Routine  2020 11:26 AM  Migraine without  Results for 
this



 WITHOUT CONTRAST    EST  aura and without  procedure are in



 [address]      status migrainosus,  the results



       not intractable  section.



documented in this encounter



Results

MR Brain with and without Contrast (2020 11:26 AM EST)





 Specimen

 

 









 Impressions  Performed At

 

 Impression: Unremarkable MRI brain without and with contrast. No Chiari  [facility] 
RADIOLOGY



 malformation.  









 Narrative  Performed At

 

 This result has an attachment that is not available.



Clinical Indication: 24 years Male, Headache, r/o Chiari  [facility] RAD[facility]



   



 Multiplanar multisequence MR images of the brain without and with contrast 
were submitted for interpretation  



   



 Comparison: 

None.  



   



 The ventricles and sulci are within normal limits. 

There is no midline shift. 

No foci of abnormal signal intensity are seen within the brain. 

There is no abnormal enhancement. 

There is n  



 o acute intraparenchymal hemorrhage. 

There is no restricted diffusion. 

The pituitary gland is not enlarged. The posterior pituitary is identified. 
The corpus callosum is fully formed. The cerebe  



 llar tonsils are normally located above the foramen magnum. Flow voids at the 
skull base are intact.  



   









 Procedure Note

 

 Interface, Received Via [facility] - 2020  1:39 PM EST



Clinical Indication: 24 years Male, Headache, r/o Chiari



 



 Multiplanar multisequence MR images of the brain without and with contrast 
were submitted for interpretation



 



 Comparison:  None.



 



 The ventricles and sulci are within normal limits.  There is no midline shift.
  No foci of abnormal signal intensity are seen within the brain.  There is no 
abnormal enhancement.  There is no acute intraparenchymal



 hemorrhage.  There is no restricted diffusion.  The pituitary gland is not 
enlarged. The posterior pituitary is identified. The corpus callosum is fully 
formed. The cerebellar tonsils are normally located above the



 foramen magnum. Flow voids at the skull base are intact.



 



 



 Impression: Unremarkable MRI brain without and with contrast. No Chiari 
malformation.









 Performing Organization  Address  City/State/Zipcode  Phone Number

 

 [facility] RADIOLOG[facility]  [address] [place] [address]  



documented in this encounter



Visit Diagnoses







 Diagnosis

 

 Migraine without aura and without status migrainosus, not intractable







 Migraine without aura, without mention of intractable migraine without mention 
of



 status migrainosus



documented in this encounter



Administered Medications







 Medication Order  MAR Action  Action Date  Dose  Rate  Site

 

 gadobutrol (GADAVIST) contrast  Given  2020 11:10 AM EST  7 mLs    



 injection 8 mL



          



 8 mL (rounded from 8.09 mL = 0.1          



 mL/kg 

          



 

          



 80.9 kg), Intravenous, 1 TIME          



 IMAGING, Wed 20 at 1115, For          



 1 dose, Imaging Protocol, Do not          



 mix or administer in the same IV          



 line with other medications.,          









   



documented in this encounter

## 2020-04-23 ENCOUNTER — HOSPITAL ENCOUNTER (EMERGENCY)
Dept: HOSPITAL 25 - ED | Age: 25
Discharge: HOME | End: 2020-04-23
Payer: COMMERCIAL

## 2020-04-23 VITALS — SYSTOLIC BLOOD PRESSURE: 121 MMHG | DIASTOLIC BLOOD PRESSURE: 87 MMHG

## 2020-04-23 DIAGNOSIS — F32.9: ICD-10-CM

## 2020-04-23 DIAGNOSIS — Z72.0: ICD-10-CM

## 2020-04-23 DIAGNOSIS — R10.13: ICD-10-CM

## 2020-04-23 DIAGNOSIS — F41.9: ICD-10-CM

## 2020-04-23 DIAGNOSIS — K29.70: Primary | ICD-10-CM

## 2020-04-23 DIAGNOSIS — R11.2: ICD-10-CM

## 2020-04-23 LAB
ALBUMIN SERPL BCG-MCNC: 4.4 G/DL (ref 3.2–5.2)
ALBUMIN/GLOB SERPL: 1.8 {RATIO} (ref 1–3)
ALP SERPL-CCNC: 67 U/L (ref 34–104)
ALT SERPL W P-5'-P-CCNC: 27 U/L (ref 7–52)
ANION GAP SERPL CALC-SCNC: 6 MMOL/L (ref 2–11)
AST SERPL-CCNC: (no result) U/L (ref 13–39)
BASOPHILS # BLD AUTO: 0.1 10^3/UL (ref 0–0.2)
BUN SERPL-MCNC: 21 MG/DL (ref 6–24)
BUN/CREAT SERPL: 15.9 (ref 8–20)
CALCIUM SERPL-MCNC: 8.9 MG/DL (ref 8.6–10.3)
CHLORIDE SERPL-SCNC: 105 MMOL/L (ref 101–111)
EOSINOPHIL # BLD AUTO: 0.1 10^3/UL (ref 0–0.6)
GLOBULIN SER CALC-MCNC: 2.4 G/DL (ref 2–4)
GLUCOSE SERPL-MCNC: 105 MG/DL (ref 70–100)
HCO3 SERPL-SCNC: 26 MMOL/L (ref 22–32)
HCT VFR BLD AUTO: 41 % (ref 42–52)
HGB BLD-MCNC: 14.4 G/DL (ref 14–18)
LYMPHOCYTES # BLD AUTO: 1.6 10^3/UL (ref 1–4.8)
MCH RBC QN AUTO: 30 PG (ref 27–31)
MCHC RBC AUTO-ENTMCNC: 35 G/DL (ref 31–36)
MCV RBC AUTO: 87 FL (ref 80–94)
MONOCYTES # BLD AUTO: 0.7 10^3/UL (ref 0–0.8)
NEUTROPHILS # BLD AUTO: 5.2 10^3/UL (ref 1.5–7.7)
NRBC # BLD AUTO: 0 10^3/UL
NRBC BLD QL AUTO: 0
PLATELET # BLD AUTO: 291 10^3/UL (ref 150–450)
POTASSIUM SERPL-SCNC: (no result) MMOL/L (ref 3.5–5)
PROT SERPL-MCNC: 6.8 G/DL (ref 6.4–8.9)
RBC # BLD AUTO: 4.75 10^6 /UL (ref 4.18–5.48)
SODIUM SERPL-SCNC: 137 MMOL/L (ref 135–145)
WBC # BLD AUTO: 7.7 10^3/UL (ref 3.5–10.8)

## 2020-04-23 PROCEDURE — 80053 COMPREHEN METABOLIC PANEL: CPT

## 2020-04-23 PROCEDURE — 83690 ASSAY OF LIPASE: CPT

## 2020-04-23 PROCEDURE — 85025 COMPLETE CBC W/AUTO DIFF WBC: CPT

## 2020-04-23 PROCEDURE — 36415 COLL VENOUS BLD VENIPUNCTURE: CPT

## 2020-04-23 PROCEDURE — 99283 EMERGENCY DEPT VISIT LOW MDM: CPT

## 2020-04-23 PROCEDURE — 96361 HYDRATE IV INFUSION ADD-ON: CPT

## 2020-04-23 PROCEDURE — 96374 THER/PROPH/DIAG INJ IV PUSH: CPT

## 2020-04-23 NOTE — ED
GI/ HPI





- HPI Summary


HPI Summary: 


 23 y/o male w no PMH p/w sharp epigastric abdominal pain, intermittent for 

several weeks. Patient reports it feels acidic like. Worse w alcohol. No fever 

or diarrhea. Has had intermittent symptoms for over one year. Had one episode 

of vomiting today. No NSAIDs.





- History of Current Complaint


Time Seen by Provider: 04/23/20 07:10


Stated Complaint: NAUSEA PER PT


Hx Obtained From: Patient


Onset/Duration: Still Present


Timing: Intermittent


Location of Pain: Epigastric


Pain Characteristics: Sharp


Aggravating Factor(s): Liquids - alcohol


Alleviating Factor(s): Nothing





- Allergy/Home Medications


Allergies/Adverse Reactions: 


 Allergies











Allergy/AdvReac Type Severity Reaction Status Date / Time


 


No Known Allergies Allergy   Verified 04/23/20 07:47











Home Medications: 


 Home Medications





Fluoxetine HCl 80 mg PO DAILY 04/23/20 [History Confirmed 04/23/20]


Omeprazole CAP (NF) [Prilosec CAP* 20 MG] 20 mg PO BID 30 Days #60 cap. 04/23/ 20 [Rx]


Ondansetron ODT TAB* [Zofran 4 MG Odt TAB*] 4 mg PO Q8H PRN 4 Days #12 tab.odt 

04/23/20 [Rx]











PMH/Surg Hx/FS Hx/Imm Hx


Endocrine/Hematology History: 


   Denies: Hx Diabetes


Cardiovascular History: 


   Denies: Hx Hypertension, Other Cardiovascular Problems/Disorders


Respiratory History: Reports: Other Respiratory Problems/Disorders - history of 

croup age 3 months


GI History: Reports: Hx Jaundice - as an infant


   Denies: Other GI Disorders


 History: 


   Denies: Other  Problems/Disorders


Musculoskeletal History: Reports: Other Musculoskeletal History - Displaced 

fracture of lateral end of left clavicle


Sensory History: 


   Denies: Hx Contacts or Glasses


Opthamlomology History: 


   Denies: Hx Contacts or Glasses


Neurological History: 


   Denies: Other Neuro Impairments/Disorders


Psychiatric History: Reports: Hx Anxiety - meds, Hx Depression - meds





- Surgical History


Surgical History: Yes


Surgery Procedure, Year, and Place: left clavical surg 7/2018 [facility]


Hx Anesthesia Reactions: No





- Family History


Known Family History: 


   Negative: Cardiac Disease, Hypertension





- Social History


Alcohol Use: Weekly


Alcohol Amount: 1-2 drinks per week


Substance Use Type: Reports: None


Hx Tobacco Use: No


Smoking Status (MU): Never Smoked Tobacco


Have You Smoked in the Last Year: No





Review of Systems


Negative: Fever


Positive: Abdominal Pain, Vomiting, Nausea.  Negative: Diarrhea


All Other Systems Reviewed And Are Negative: Yes





Physical Exam





- Summary


Physical Exam Summary: 





Constitutional: Well-developed, Well-nourished, Alert. (-) Distressed


Skin: Warm, Dry


HENT: Normocephalic; Atraumatic


Eyes: Conjunctiva normal


Neck: Musculoskeletal ROM normal neck. (-) JVD, (-) Stridor, (-) Nuchal rigidity


Cardio: Rhythm regular, rate normal, Heart sounds normal; Intact distal pulses; 

Radial pulses are 2+ and symmetric. (-) Murmur


Pulmonary/Chest wall: Effort normal. (-) Respiratory distress, (-) Wheezes, (-) 

Rales


Abd: Soft, mild epigastric tenderness, (-) Distension, (-) Guarding, (-) Rebound


Musculoskeletal: (-) Edema


Neuro: Alert, Oriented x3


Psych: Mood and affect Normal





Triage Information Reviewed: Yes


Vital Signs Reviewed: Yes





Procedures





- Sedation


Patient Received Moderate/Deep Sedation with Procedure: No





Diagnostics





- Laboratory


Result Diagrams: 


 04/23/20 07:29





 04/23/20 07:29


Lab Statement: Any lab studies that have been ordered have been reviewed, and 

results considered in the medical decision making process.





Re-Evaluation





- Re-Evaluation


  ** First Eval


Re-Evaluation Time: 08:15


Change: Improved - tolerating PO.





GIGU Course/Dx





- Course


Course Of Treatment: 23 y/o male p/w epigastric tenderness, check basic labs 

and give GI cocktail.  - exam and hx g/w gastritis. Will try omeprazole. Cr 1.3

, encouraged fluids and recheck w PCP. K hemolyzed.





- Diagnoses


Provider Diagnoses: 


 Gastritis, Abdominal pain








- Critical Care Time


Critical Care Statement: Critical care time is provided exclusive of any time 

spent performing procedures.





Discharge ED





- Sign-Out/Discharge


Documenting (check all that apply): Patient Departure





- Discharge Plan


Condition: Stable


Disposition: HOME


Prescriptions: 


Omeprazole CAP (NF) [Prilosec CAP* 20 MG] 20 mg PO BID 30 Days #60 cap.


Ondansetron ODT TAB* [Zofran 4 MG Odt TAB*] 4 mg PO Q8H PRN 4 Days #12 tab.odt


 PRN Reason: Nausea/Vomiting


Patient Education Materials:  Gastritis (ED), Acute Nausea and Vomiting (ED)


Referrals: 


Carlos Long MD [Primary Care Provider] - 


Additional Instructions: 


You were seen in the emergency department for abdominal pain. Your labs did not 

show a cause for your pain. Your kidney number (creatinine) was slightly high 

today which can be seen in dehydration. Please follow up with your doctor about 

this. Please try taking omeprazole daily for acid reflux. 


If any studies were not completed at the time of discharge you will be called 

with the relevant results.


Please follow up with your primary care doctor in next 2-3 days and return to 

emergency department for worsening pain, or concerning symptoms.


It was a pleasure taking care of you today.





- Billing Disposition and Condition


Condition: STABLE


Disposition: Home





- Attestation Statements


Document Initiated by Marlin: Yes


Documenting Scribe: Rakel Olson


Provider For Whom Marlin is Documenting (Include Credential): Brandi Farias MD


Scribe Attestation: 


IRakel, scribed for Brandi Farias MD on 04/23/20 at 0834. 


Scribe Documentation Reviewed: Yes


Provider Attestation: 


The documentation as recorded by the Rakel madden accurately reflects 

the service I personally performed and the decisions made by me, Brandi Farias MD


Status of Scribe Document: Viewed